# Patient Record
Sex: FEMALE | Race: WHITE | NOT HISPANIC OR LATINO | Employment: FULL TIME | URBAN - METROPOLITAN AREA
[De-identification: names, ages, dates, MRNs, and addresses within clinical notes are randomized per-mention and may not be internally consistent; named-entity substitution may affect disease eponyms.]

---

## 2021-04-06 DIAGNOSIS — Z23 ENCOUNTER FOR IMMUNIZATION: ICD-10-CM

## 2025-01-20 PROBLEM — I10 ESSENTIAL HYPERTENSION: Status: ACTIVE | Noted: 2023-04-25

## 2025-01-20 PROBLEM — K21.9 CHRONIC GERD: Status: ACTIVE | Noted: 2024-04-29

## 2025-01-20 NOTE — PROGRESS NOTES
Adult Annual Physical  Name: Haven Gallardo      : 1975      MRN: [id number]  Encounter Provider: Erlin Ho DO  Encounter Date: 2025   Encounter department: [facility] IN PARTNERSHIP WITH [facility]    Assessment & Plan  Annual physical exam    Orders:    CBC and differential; Future    Comprehensive metabolic panel; Future    Lipid Panel with Direct LDL reflex; Future    POCT hemoglobin A1c    Screening mammogram for breast cancer  Up-to-date.  Follows with GYN.       Cervical cancer screening  Up-to-date.  Follows with GYN.       Encounter for vaccination  Up-to-date on tetanus vaccine.  No vaccines given today.       Colon cancer screening  Patient had Cologuard performed in November.  Up-to-date       Essential hypertension  Patient is currently on amlodipine 5 mg daily and lisinopril 20 mg daily.  She has felt off since starting her blood pressure medication over a year ago.  She has had problems with feelings of fatigue and not having the energy that she used to.  She also notes that she is starting to gain some weight over time.  I will check a cortisol level along with ACTH and TSH  Orders:    ACTH; Future    Cortisol Level, AM Specimen; Future    Vitamin D 25 hydroxy; Future    Vitamin B12/Folate, Serum Panel; Future    TSH, 3rd generation with Free T4 reflex; Future    Chronic GERD  Patient reports that she has had a chronic cough for 4 years.  She was diagnosed with GERD and had an upper endoscopy recently by GI specialty.  She notes that in the past when she is taking medication for her GERD consistently her cough symptoms have been better.  Her blood pressure medications were introduced in the past year and the cough has been going on for 4 years.  I suspect that this is likely from the GERD and we will start her on Protonix 40 mg daily.  I will to recheck how she is doing in 1 month to see if it helped with her cough.  Orders:    pantoprazole  (PROTONIX) 40 mg tablet; Take 1 tablet (40 mg total) by mouth daily    JUDY (generalized anxiety disorder)  Patient has a history of general anxiety which she manages with Xanax 0.5 mg as needed.  I will take over management of her anxiety.  Patient signed a drug contract today and will do a urine drug screen at next visit.  She typically uses the medication as needed but typically requires a refill almost every month.       Long term use of drug    Orders:    ACTH; Future    Cortisol Level, AM Specimen; Future    Vitamin D 25 hydroxy; Future    Vitamin B12/Folate, Serum Panel; Future    Fatigue, unspecified type  Patient is currently on amlodipine 5 mg daily and lisinopril 20 mg daily.  She has felt off since starting her blood pressure medication over a year ago.  She has had problems with feelings of fatigue and not having the energy that she used to.  She also notes that she is starting to gain some weight over time.  I will check a cortisol level along with ACTH and TSH  Orders:    ACTH; Future    Cortisol Level, AM Specimen; Future    Vitamin D 25 hydroxy; Future    Vitamin B12/Folate, Serum Panel; Future    TSH, 3rd generation with Free T4 reflex; Future    Obesity (BMI 30-39.9)  Patient is concerned about having weight gain despite  being physically active.  I will do some lab work to evaluate for underlying conditions causing weight retention.  I discussed our care management program with the patient and we will consider this as an option if she wants to pursue weight loss medications in the future.  As of right now she would like to do her labs first and then reconvene to talk about this later on    Orders:    TSH, 3rd generation with Free T4 reflex; Future    Immunizations and preventive care screenings were discussed with patient today. Appropriate education was printed on patient's after visit summary.    Counseling:  Dental Health: discussed importance of regular tooth brushing, flossing, and dental  visits.  Exercise: the importance of regular exercise/physical activity was discussed. Recommend exercise 3-5 times per week for at least 30 minutes.     BMI Counseling: Body mass index is 32.12 kg/m². The BMI is above normal. Nutrition recommendations include encouraging healthy choices of fruits and vegetables. Exercise recommendations include exercising 3-5 times per week. Rationale for BMI follow-up plan is due to patient being overweight or obese.     Depression Screening and Follow-up Plan: Patient was screened for depression during today's encounter. They screened negative with a PHQ-2 score of 0.        Patient was seen today for an annual physical exam. Age appropriate screening tests were done as above. Annual labs were ordered to be done through [facility]. Patient will be contacted regarding results and follow up will be based on findings. Patient was counseled on the importance of proper diet and exercise. I recommend diets rich in lean meats and leafy green vegetables. Try to have 150 minutes of exercise weekly at moderate intensity. See problem based charting for any chronic problems or new findings and current workup/management.    I discussed patient's echocardiogram with her and tried to explain with the findings show.  I did advise her that there was nothing significantly wrong on the testing other than some mild mitral regurgitation.    40 minutes were spent on chart review, examination, and plan of care. This note was dictated using software.     History of Present Illness     Adult Annual Physical:  Patient presents for annual physical.     Diet and Physical Activity:    - Exercise: walking and moderate cardiovascular exercise.    Depression Screening:  - PHQ-2 Score: 0    General Health:  - Sleep: sleeps well.    /GYN Health:  - Follows with GYN: yes.     Review of Systems   Constitutional:  Positive for fatigue. Negative for fever.   Respiratory:  Positive for cough. Negative for shortness  "of breath.    Cardiovascular:  Negative for chest pain.   Gastrointestinal:  Negative for abdominal pain, constipation and diarrhea.        Gerd   Genitourinary:  Negative for difficulty urinating.   Skin:  Negative for rash.     Patient has been having a cough for 4 years. Patient states that she had covid in November of 2021. [facility] and had [facility] again. She has continued to have the cough. She initially has been diagnosed with acid reflux and has been treated for it.     Patient has also had issues with fatigue and general loss of energy since going on her blood pressure medication over a year ago.  She is unsure if it is related to the medication or if something else could be going on.  She has noticed that despite exercising regularly she is not losing any weight.  This is a concern for her and she has even thought about weight medications but wanted to ask some questions about them.    She also had a echocardiogram done on her heart and had some questions today about what the findings are on the test.  She has not been contacted by anyone about her results yet.    Objective   /70 (BP Location: Right arm, Patient Position: Sitting, Cuff Size: Large)   Pulse 65   Temp 98.4 °F (36.9 °C) (Temporal)   Resp 16   Ht 5' 5\" (1.651 m)   Wt 87.5 kg (193 lb)   SpO2 97%   BMI 32.12 kg/m²     Physical Exam  Vitals and nursing note reviewed.   Constitutional:       General: She is not in acute distress.     Appearance: Normal appearance. She is well-developed. She is obese.   HENT:      Head: Normocephalic and atraumatic.      Right Ear: Tympanic membrane, ear canal and external ear normal.      Left Ear: Tympanic membrane, ear canal and external ear normal.      Nose: Nose normal. No congestion.      Mouth/Throat:      Mouth: Mucous membranes are moist.      Pharynx: No oropharyngeal exudate or posterior oropharyngeal erythema.   Eyes:      Extraocular Movements: Extraocular movements intact. "      Conjunctiva/sclera: Conjunctivae normal.      Pupils: Pupils are equal, round, and reactive to light.   Cardiovascular:      Rate and Rhythm: Normal rate and regular rhythm.      Heart sounds: Normal heart sounds. No murmur heard.  Pulmonary:      Effort: Pulmonary effort is normal. No respiratory distress.      Breath sounds: Normal breath sounds. No wheezing.   Abdominal:      General: Abdomen is flat.      Palpations: Abdomen is soft.      Tenderness: There is no abdominal tenderness. There is no guarding.   Musculoskeletal:         General: Normal range of motion.      Cervical back: Neck supple.   Lymphadenopathy:      Cervical: No cervical adenopathy.   Skin:     General: Skin is warm and dry.      Findings: No rash.   Neurological:      General: No focal deficit present.      Mental Status: She is alert and oriented to person, place, and time. Mental status is at baseline.   Psychiatric:         Mood and Affect: Mood normal.         Behavior: Behavior normal.         Thought Content: Thought content normal.         Judgment: Judgment normal.

## 2025-01-23 ENCOUNTER — TELEPHONE (OUTPATIENT)
Dept: ADMINISTRATIVE | Facility: OTHER | Age: 50
End: 2025-01-23

## 2025-01-23 NOTE — TELEPHONE ENCOUNTER
----- Message from Sayra GANDHI sent at 1/23/2025  9:47 AM EST -----  Regarding: Quality Update  01/23/25 9:47 AM    Mary, our patient Haven Gallardo has had CRC: Cologuard completed/performed. Please assist in updating the patient chart by pulling the Care Everywhere (CE) document. The date of service is 11/16/2024.     Thank you,  GEOVANNA Stroud [facility]

## 2025-01-23 NOTE — TELEPHONE ENCOUNTER
----- Message from Sayra GANDHI sent at 1/23/2025  9:47 AM EST -----  Regarding: Quality Update  01/23/25 9:47 AM    Mary, our patient Haven Gallardo has had Pap Smear (HPV) aka Cervical Cancer Screening completed/performed. Please assist in updating the patient chart by pulling the Care Everywhere (CE) document. The date of service is 06/20/2023.     Thank you,  GEOVANNA Stroud [facility]

## 2025-01-23 NOTE — TELEPHONE ENCOUNTER
----- Message from Sayra GANDHI sent at 1/23/2025  9:48 AM EST -----  Regarding: Quality Update  01/23/25 9:48 AM    Mary, our patient Haven Gallardo has had Mammogram completed/performed. Please assist in updating the patient chart by pulling the Care Everywhere (CE) document. The date of service is 07/27/2024.     Thank you,  GEOVANNA Stroud [facility]

## 2025-01-24 ENCOUNTER — RA CDI HCC (OUTPATIENT)
Dept: OTHER | Facility: HOSPITAL | Age: 50
End: 2025-01-24

## 2025-01-24 NOTE — TELEPHONE ENCOUNTER
Upon review of the In Basket request we were able to locate, review, and update the patient chart as requested for CRC: Cologuard, Mammogram, and Pap Smear (HPV) aka Cervical Cancer Screening.    Any additional questions or concerns should be emailed to the Practice Liaisons via the appropriate education email address, please do not reply via In Basket.    Thank you  Bella Aguilera MA   [facility]

## 2025-01-24 NOTE — PROGRESS NOTES
HCC coding opportunities       Chart reviewed, no opportunity found: CHART REVIEWED, NO OPPORTUNITY FOUND   This is a reminder to address (resolve/update/assess) ALL HCC (risk adjustment) codes as found on active problem list for 2025 as patient scores reset to zero AYANNA.     Patients Insurance        Commercial Insurance: [employer]

## 2025-01-28 ENCOUNTER — OFFICE VISIT (OUTPATIENT)
Dept: FAMILY MEDICINE CLINIC | Facility: CLINIC | Age: 50
End: 2025-01-28

## 2025-01-28 VITALS
SYSTOLIC BLOOD PRESSURE: 116 MMHG | HEART RATE: 65 BPM | RESPIRATION RATE: 16 BRPM | HEIGHT: 65 IN | DIASTOLIC BLOOD PRESSURE: 70 MMHG | WEIGHT: 193 LBS | BODY MASS INDEX: 32.15 KG/M2 | OXYGEN SATURATION: 97 % | TEMPERATURE: 98.4 F

## 2025-01-28 DIAGNOSIS — R53.83 FATIGUE, UNSPECIFIED TYPE: ICD-10-CM

## 2025-01-28 DIAGNOSIS — K21.9 CHRONIC GERD: ICD-10-CM

## 2025-01-28 DIAGNOSIS — Z12.11 COLON CANCER SCREENING: ICD-10-CM

## 2025-01-28 DIAGNOSIS — Z00.00 ANNUAL PHYSICAL EXAM: Primary | ICD-10-CM

## 2025-01-28 DIAGNOSIS — Z23 ENCOUNTER FOR VACCINATION: ICD-10-CM

## 2025-01-28 DIAGNOSIS — Z12.31 SCREENING MAMMOGRAM FOR BREAST CANCER: ICD-10-CM

## 2025-01-28 DIAGNOSIS — Z12.4 CERVICAL CANCER SCREENING: ICD-10-CM

## 2025-01-28 DIAGNOSIS — F41.1 GAD (GENERALIZED ANXIETY DISORDER): Chronic | ICD-10-CM

## 2025-01-28 DIAGNOSIS — Z79.899 LONG TERM USE OF DRUG: ICD-10-CM

## 2025-01-28 DIAGNOSIS — E66.9 OBESITY (BMI 30-39.9): ICD-10-CM

## 2025-01-28 DIAGNOSIS — I10 ESSENTIAL HYPERTENSION: ICD-10-CM

## 2025-01-28 PROBLEM — E78.2 MIXED HYPERLIPIDEMIA: Status: ACTIVE | Noted: 2025-01-28

## 2025-01-28 LAB — SL AMB POCT HEMOGLOBIN AIC: 5.2 (ref ?–6.5)

## 2025-01-28 PROCEDURE — 99213 OFFICE O/P EST LOW 20 MIN: CPT | Performed by: STUDENT IN AN ORGANIZED HEALTH CARE EDUCATION/TRAINING PROGRAM

## 2025-01-28 PROCEDURE — 83036 HEMOGLOBIN GLYCOSYLATED A1C: CPT | Performed by: STUDENT IN AN ORGANIZED HEALTH CARE EDUCATION/TRAINING PROGRAM

## 2025-01-28 PROCEDURE — 99386 PREV VISIT NEW AGE 40-64: CPT | Performed by: STUDENT IN AN ORGANIZED HEALTH CARE EDUCATION/TRAINING PROGRAM

## 2025-01-28 RX ORDER — LISINOPRIL 20 MG/1
20 TABLET ORAL EVERY MORNING
COMMUNITY

## 2025-01-28 RX ORDER — GLUCOSAMINE/D3/BOSWELLIA SERRA 1500MG-400
TABLET ORAL
COMMUNITY

## 2025-01-28 RX ORDER — ESTRADIOL/NORETHINDRONE ACETATE TRANSDERMAL SYSTEM .05; .25 MG/D; MG/D
PATCH, EXTENDED RELEASE TRANSDERMAL
COMMUNITY

## 2025-01-28 RX ORDER — PANTOPRAZOLE SODIUM 40 MG/1
40 TABLET, DELAYED RELEASE ORAL DAILY
Qty: 30 TABLET | Refills: 5 | Status: SHIPPED | OUTPATIENT
Start: 2025-01-28

## 2025-01-28 RX ORDER — AMLODIPINE BESYLATE 5 MG/1
1 TABLET ORAL DAILY
COMMUNITY
Start: 2024-11-15

## 2025-01-28 RX ORDER — ALPRAZOLAM 0.5 MG
TABLET ORAL
COMMUNITY
Start: 2025-01-20

## 2025-01-28 RX ORDER — ROSUVASTATIN CALCIUM 10 MG/1
10 TABLET, COATED ORAL DAILY
COMMUNITY

## 2025-01-28 RX ORDER — NITROFURANTOIN 25; 75 MG/1; MG/1
CAPSULE ORAL
COMMUNITY

## 2025-01-28 RX ORDER — ACETAMINOPHEN 160 MG/5ML
SUSPENSION, ORAL (FINAL DOSE FORM) ORAL
COMMUNITY

## 2025-01-28 NOTE — ASSESSMENT & PLAN NOTE
Patient reports that she has had a chronic cough for 4 years.  She was diagnosed with GERD and had an upper endoscopy recently by GI specialty.  She notes that in the past when she is taking medication for her GERD consistently her cough symptoms have been better.  Her blood pressure medications were introduced in the past year and the cough has been going on for 4 years.  I suspect that this is likely from the GERD and we will start her on Protonix 40 mg daily.  I will to recheck how she is doing in 1 month to see if it helped with her cough.  Orders:    pantoprazole (PROTONIX) 40 mg tablet; Take 1 tablet (40 mg total) by mouth daily

## 2025-01-28 NOTE — ASSESSMENT & PLAN NOTE
Patient has a history of general anxiety which she manages with Xanax 0.5 mg as needed.  I will take over management of her anxiety.  Patient signed a drug contract today and will do a urine drug screen at next visit.  She typically uses the medication as needed but typically requires a refill almost every month.

## 2025-01-28 NOTE — ASSESSMENT & PLAN NOTE
Patient is currently on amlodipine 5 mg daily and lisinopril 20 mg daily.  She has felt off since starting her blood pressure medication over a year ago.  She has had problems with feelings of fatigue and not having the energy that she used to.  She also notes that she is starting to gain some weight over time.  I will check a cortisol level along with ACTH and TSH  Orders:    ACTH; Future    Cortisol Level, AM Specimen; Future    Vitamin D 25 hydroxy; Future    Vitamin B12/Folate, Serum Panel; Future    TSH, 3rd generation with Free T4 reflex; Future

## 2025-02-17 ENCOUNTER — CLINICAL SUPPORT (OUTPATIENT)
Dept: FAMILY MEDICINE CLINIC | Facility: CLINIC | Age: 50
End: 2025-02-17

## 2025-02-17 DIAGNOSIS — I10 ESSENTIAL HYPERTENSION: ICD-10-CM

## 2025-02-17 DIAGNOSIS — E66.9 OBESITY (BMI 30-39.9): ICD-10-CM

## 2025-02-17 DIAGNOSIS — Z79.899 LONG TERM USE OF DRUG: ICD-10-CM

## 2025-02-17 DIAGNOSIS — Z00.00 ANNUAL PHYSICAL EXAM: ICD-10-CM

## 2025-02-17 DIAGNOSIS — R53.83 FATIGUE, UNSPECIFIED TYPE: ICD-10-CM

## 2025-02-17 PROCEDURE — 36415 COLL VENOUS BLD VENIPUNCTURE: CPT

## 2025-02-18 ENCOUNTER — RESULTS FOLLOW-UP (OUTPATIENT)
Dept: FAMILY MEDICINE CLINIC | Facility: CLINIC | Age: 50
End: 2025-02-18

## 2025-02-18 LAB
25(OH)D3 SERPL-MCNC: 38 NG/ML (ref 30–100)
ACTH PLAS-MCNC: NORMAL PG/ML
ALBUMIN SERPL-MCNC: 4.5 G/DL (ref 3.6–5.1)
ALBUMIN/GLOB SERPL: 1.7 (CALC) (ref 1–2.5)
ALP SERPL-CCNC: 51 U/L (ref 37–153)
ALT SERPL-CCNC: 17 U/L (ref 6–29)
AST SERPL-CCNC: 14 U/L (ref 10–35)
BASOPHILS # BLD AUTO: 28 CELLS/UL (ref 0–200)
BASOPHILS NFR BLD AUTO: 0.4 %
BILIRUB SERPL-MCNC: 0.8 MG/DL (ref 0.2–1.2)
BUN SERPL-MCNC: 12 MG/DL (ref 7–25)
BUN/CREAT SERPL: ABNORMAL (CALC) (ref 6–22)
CALCIUM SERPL-MCNC: 9.4 MG/DL (ref 8.6–10.4)
CHLORIDE SERPL-SCNC: 100 MMOL/L (ref 98–110)
CHOLEST SERPL-MCNC: 165 MG/DL
CHOLEST/HDLC SERPL: 2.1 (CALC)
CO2 SERPL-SCNC: 23 MMOL/L (ref 20–32)
CORTIS AM PEAK SERPL-MCNC: 12.6 MCG/DL
CREAT SERPL-MCNC: 0.58 MG/DL (ref 0.5–1.03)
EOSINOPHIL # BLD AUTO: 142 CELLS/UL (ref 15–500)
EOSINOPHIL NFR BLD AUTO: 2 %
ERYTHROCYTE [DISTWIDTH] IN BLOOD BY AUTOMATED COUNT: 12.7 % (ref 11–15)
FOLATE SERPL-MCNC: 15.9 NG/ML
GFR/BSA.PRED SERPLBLD CYS-BASED-ARV: 110 ML/MIN/1.73M2
GLOBULIN SER CALC-MCNC: 2.7 G/DL (CALC) (ref 1.9–3.7)
GLUCOSE SERPL-MCNC: 100 MG/DL (ref 65–99)
HCT VFR BLD AUTO: 42.5 % (ref 35–45)
HDLC SERPL-MCNC: 77 MG/DL
HGB BLD-MCNC: 14.3 G/DL (ref 11.7–15.5)
LDLC SERPL CALC-MCNC: 74 MG/DL (CALC)
LYMPHOCYTES # BLD AUTO: 1882 CELLS/UL (ref 850–3900)
LYMPHOCYTES NFR BLD AUTO: 26.5 %
MCH RBC QN AUTO: 31.2 PG (ref 27–33)
MCHC RBC AUTO-ENTMCNC: 33.6 G/DL (ref 32–36)
MCV RBC AUTO: 92.8 FL (ref 80–100)
MONOCYTES # BLD AUTO: 611 CELLS/UL (ref 200–950)
MONOCYTES NFR BLD AUTO: 8.6 %
NEUTROPHILS # BLD AUTO: 4438 CELLS/UL (ref 1500–7800)
NEUTROPHILS NFR BLD AUTO: 62.5 %
NONHDLC SERPL-MCNC: 88 MG/DL (CALC)
PLATELET # BLD AUTO: 272 THOUSAND/UL (ref 140–400)
PMV BLD REES-ECKER: 10.6 FL (ref 7.5–12.5)
POTASSIUM SERPL-SCNC: 4.3 MMOL/L (ref 3.5–5.3)
PROT SERPL-MCNC: 7.2 G/DL (ref 6.1–8.1)
RBC # BLD AUTO: 4.58 MILLION/UL (ref 3.8–5.1)
SODIUM SERPL-SCNC: 135 MMOL/L (ref 135–146)
TRIGL SERPL-MCNC: 56 MG/DL
TSH SERPL-ACNC: 2.85 MIU/L
VIT B12 SERPL-MCNC: 435 PG/ML (ref 200–1100)
WBC # BLD AUTO: 7.1 THOUSAND/UL (ref 3.8–10.8)

## 2025-02-18 NOTE — ASSESSMENT & PLAN NOTE
Patient will use the 40 mg of Protonix as needed only at this time.  She will transition back to over-the-counter medication.  Orders:    pantoprazole (PROTONIX) 40 mg tablet; Take 1 tablet (40 mg total) by mouth daily

## 2025-02-18 NOTE — PROGRESS NOTES
Name: Haven Gallardo      : 1975      MRN: [id number]  Encounter Provider: Erlin Ho DO  Encounter Date: 2025   Encounter department: [facility] IN PARTNERSHIP WITH [facility]  :  Assessment & Plan  Essential hypertension  Patient would like to try discontinuing her current blood pressure medications and starting a new medication.  She is concerned that her medications are causing her fatigue.  I will discontinue amlodipine and lisinopril.  I will transition her to valsartan 320 mg daily.  Will reassess in 1 month.  Orders:    valsartan (DIOVAN) 320 MG tablet; Take 1 tablet (320 mg total) by mouth daily    Chronic GERD  Patient will use the 40 mg of Protonix as needed only at this time.  She will transition back to over-the-counter medication.  Orders:    pantoprazole (PROTONIX) 40 mg tablet; Take 1 tablet (40 mg total) by mouth daily    Fatigue, unspecified type  Will attempt to switch blood pressure medication and see if this helps with her chronic fatigue symptoms.       Chronic cough  Suspect that cough is likely due to chronic nasal drainage down the back of the throat.  Will start ipratropium nasal spray 2 sprays 3 times a day as needed  Orders:    ipratropium (ATROVENT) 0.06 % nasal spray; 2 sprays into each nostril 3 (three) times a day as needed for rhinitis    pantoprazole (PROTONIX) 40 mg tablet; Take 1 tablet (40 mg total) by mouth daily    Obesity (BMI 30-39.9)  Patient would like to begin the process for starting weight loss medications.  I will refer to our care management    Orders:    Ambulatory referral to complex care management program; Future             Patient is a 49-year-old female presenting today for follow-up on blood pressure, fatigue and previous cough.  See problem based charting as above for full plan of care.  Will reassess in 1 month for repeat blood pressure check.      History of Present Illness   HPI  Review of Systems  "  Constitutional:  Positive for fatigue. Negative for fever.   HENT:  Positive for postnasal drip.    Respiratory:  Positive for cough. Negative for shortness of breath.    Cardiovascular:  Negative for chest pain.   Gastrointestinal:  Negative for abdominal pain.        Gerd   Skin:  Negative for rash.     Patient is presenting today to review recent lab work for fatigue.  We are also following up on previous cough which I thought was likely due to GERD.  I placed her on Protonix 40 mg daily.    She stated that the Protonix helped reduce her cough frequency but was not any different to using the over-the-counter medication consistently.  She would like to stop this and restart the over-the-counter medication.    She has been having a lot of postnasal drip which may be contributing to her current symptoms.  The cough is mainly the worst in the mornings and evenings.    Her lab work showed no medical causes of fatigue.  She feels that her medications are making her fatigued and would like to consider trying a different blood pressure med.    She would also like to discuss weight management and going on a medication for weight.    Objective   /62 (BP Location: Right arm, Patient Position: Sitting, Cuff Size: Large)   Pulse 82   Temp 97.8 °F (36.6 °C) (Temporal)   Resp 14   Ht 5' 5\" (1.651 m)   Wt 87.5 kg (193 lb)   SpO2 98%   BMI 32.12 kg/m²      Physical Exam  Vitals and nursing note reviewed.   Constitutional:       General: She is not in acute distress.     Appearance: Normal appearance. She is well-developed. She is obese.   HENT:      Head: Normocephalic and atraumatic.      Right Ear: External ear normal.      Left Ear: External ear normal.      Nose: Nose normal.   Eyes:      Conjunctiva/sclera: Conjunctivae normal.   Pulmonary:      Effort: Pulmonary effort is normal. No respiratory distress.   Skin:     General: Skin is warm and dry.      Findings: No rash.   Neurological:      General: No focal " deficit present.      Mental Status: She is alert and oriented to person, place, and time. Mental status is at baseline.   Psychiatric:         Mood and Affect: Mood normal.         Behavior: Behavior normal.         Thought Content: Thought content normal.         Judgment: Judgment normal.       Administrative Statements   I have spent a total time of 20 minutes in caring for this patient on the day of the visit/encounter including Instructions for management, Documenting in the medical record, and Obtaining or reviewing history  .

## 2025-02-18 NOTE — ASSESSMENT & PLAN NOTE
Patient would like to try discontinuing her current blood pressure medications and starting a new medication.  She is concerned that her medications are causing her fatigue.  I will discontinue amlodipine and lisinopril.  I will transition her to valsartan 320 mg daily.  Will reassess in 1 month.  Orders:    valsartan (DIOVAN) 320 MG tablet; Take 1 tablet (320 mg total) by mouth daily

## 2025-02-28 ENCOUNTER — OFFICE VISIT (OUTPATIENT)
Dept: FAMILY MEDICINE CLINIC | Facility: CLINIC | Age: 50
End: 2025-02-28

## 2025-02-28 VITALS
HEIGHT: 65 IN | TEMPERATURE: 97.8 F | RESPIRATION RATE: 14 BRPM | SYSTOLIC BLOOD PRESSURE: 102 MMHG | BODY MASS INDEX: 32.15 KG/M2 | DIASTOLIC BLOOD PRESSURE: 62 MMHG | HEART RATE: 82 BPM | WEIGHT: 193 LBS | OXYGEN SATURATION: 98 %

## 2025-02-28 DIAGNOSIS — K21.9 CHRONIC GERD: ICD-10-CM

## 2025-02-28 DIAGNOSIS — E66.9 OBESITY (BMI 30-39.9): ICD-10-CM

## 2025-02-28 DIAGNOSIS — R53.83 FATIGUE, UNSPECIFIED TYPE: ICD-10-CM

## 2025-02-28 DIAGNOSIS — I10 ESSENTIAL HYPERTENSION: Primary | ICD-10-CM

## 2025-02-28 DIAGNOSIS — R05.3 CHRONIC COUGH: ICD-10-CM

## 2025-02-28 PROCEDURE — 99213 OFFICE O/P EST LOW 20 MIN: CPT | Performed by: STUDENT IN AN ORGANIZED HEALTH CARE EDUCATION/TRAINING PROGRAM

## 2025-02-28 RX ORDER — VALSARTAN 320 MG/1
320 TABLET ORAL DAILY
Qty: 30 TABLET | Refills: 5 | Status: SHIPPED | OUTPATIENT
Start: 2025-02-28

## 2025-02-28 RX ORDER — PANTOPRAZOLE SODIUM 40 MG/1
40 TABLET, DELAYED RELEASE ORAL DAILY
Qty: 30 TABLET | Refills: 5
Start: 2025-02-28

## 2025-02-28 RX ORDER — IPRATROPIUM BROMIDE 42 UG/1
2 SPRAY, METERED NASAL 3 TIMES DAILY PRN
Qty: 15 ML | Refills: 2 | Status: SHIPPED | OUTPATIENT
Start: 2025-02-28

## 2025-02-28 NOTE — ASSESSMENT & PLAN NOTE
Suspect that cough is likely due to chronic nasal drainage down the back of the throat.  Will start ipratropium nasal spray 2 sprays 3 times a day as needed  Orders:    ipratropium (ATROVENT) 0.06 % nasal spray; 2 sprays into each nostril 3 (three) times a day as needed for rhinitis    pantoprazole (PROTONIX) 40 mg tablet; Take 1 tablet (40 mg total) by mouth daily

## 2025-03-03 ENCOUNTER — PATIENT OUTREACH (OUTPATIENT)
Age: 50
End: 2025-03-03

## 2025-03-03 NOTE — PROGRESS NOTES
Called Patient to introduce them to care management program. No answer, voicemail left with callback information. My Chart message sent as well with request to respond with best day and time for outreach. Will reach out again if no return response.    Patient responded via [employer] to schedule initial outreach phone call.

## 2025-03-19 ENCOUNTER — PATIENT OUTREACH (OUTPATIENT)
Dept: FAMILY MEDICINE CLINIC | Facility: CLINIC | Age: 50
End: 2025-03-19

## 2025-03-19 NOTE — PROGRESS NOTES
Patient responded to initial outreach with times open, sent response with days this week and patient did not respond. Sent additional message with availability next week. If you response in 2 weeks will send unable to reach and close case.

## 2025-03-23 DIAGNOSIS — F41.1 GAD (GENERALIZED ANXIETY DISORDER): Primary | Chronic | ICD-10-CM

## 2025-03-24 RX ORDER — ALPRAZOLAM 0.5 MG
0.5 TABLET ORAL DAILY PRN
Qty: 30 TABLET | Refills: 0 | Status: SHIPPED | OUTPATIENT
Start: 2025-03-24

## 2025-03-26 ENCOUNTER — PATIENT OUTREACH (OUTPATIENT)
Dept: FAMILY MEDICINE CLINIC | Facility: CLINIC | Age: 50
End: 2025-03-26

## 2025-03-26 NOTE — PROGRESS NOTES
Spoke with patient during scheduled outreach. Patient has done ww in the past and noom in the last 4years though nothing has worked. I provided overview of program and she consented to participation. She drinks about 64oz of water but 10oz of coffee and about 2-6oz of vodka with cranberry at night. Explained hydration protocol. She also said she would like to start in on food tracking. Provided name of anuj and asked that she track from now until next call. She also said she eats an apple for breakfast and shakeology for lunch and a large dinner. We discussed options that she doesn't have an aversion to in order to boost the protein. Patient verbalized understanding. Next phone outreach 4/2

## 2025-04-02 ENCOUNTER — PATIENT OUTREACH (OUTPATIENT)
Dept: FAMILY MEDICINE CLINIC | Facility: CLINIC | Age: 50
End: 2025-04-02

## 2025-04-02 NOTE — PROGRESS NOTES
Patient was able to meet water goal of 80oz. She did track as well and was between 5051-5691. Chose a goal to start with of 9882-5536 calories. Macros were F53, C25, P22. Starting with fat goal of 20% or 29g of fat a day. Helped patient to adjust anuj to new goals. She did not have any questions or concerns. Patient verbalized understanding of goals. Next phone outreach 4/30

## 2025-04-06 NOTE — PROGRESS NOTES
Name: Haven Gallardo      : 1975      MRN: [id number]  Encounter Provider: Erlin Ho DO  Encounter Date: 2025   Encounter department: [facility] IN PARTNERSHIP WITH [facility]  :  Assessment & Plan  Essential hypertension                  Patient is a 49-year-old female who had her previous blood pressure medication stopped due to fatigue.  She was switched to valsartan and is here to reassess blood pressure today.  Blood pressure is under control but she is continuing to feel fatigue at this time.  Her labs have been normal.  At this point my recommendation was that we could try switching to a whole new drug class entirely and use a diuretic.  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  She would like to trial this in the summer when she is able to.  She will reach out to me in  and we will start her on chlorthalidone with a follow-up 1 month after    History of Present Illness   HPI  Review of Systems   Constitutional:  Positive for fatigue. Negative for fever.   Respiratory:  Negative for shortness of breath.    Cardiovascular:  Negative for chest pain.   Gastrointestinal:  Negative for abdominal pain.   Skin:  Negative for rash.     Patient is a 49-year-old female who is presenting today for blood pressure check.  She had her previous medications discontinued and was switched to valsartan.  She has continued to notice fatigue.  She is currently on hormonal treatment for menopausal symptoms so she is also not sure if this just coincided with when she started the medication.    Objective   There were no vitals taken for this visit.     Physical Exam  Vitals and nursing note reviewed.   Constitutional:       General: She is not in acute distress.     Appearance: Normal appearance. She is well-developed.   HENT:      Head: Normocephalic and atraumatic.      Right Ear: External ear normal.      Left Ear:  External ear normal.      Nose: Nose normal.   Eyes:      Conjunctiva/sclera: Conjunctivae normal.   Cardiovascular:      Rate and Rhythm: Normal rate and regular rhythm.   Pulmonary:      Effort: Pulmonary effort is normal. No respiratory distress.   Skin:     General: Skin is dry.      Findings: No rash.   Neurological:      General: No focal deficit present.      Mental Status: She is alert and oriented to person, place, and time. Mental status is at baseline.   Psychiatric:         Mood and Affect: Mood normal.         Behavior: Behavior normal.         Thought Content: Thought content normal.         Judgment: Judgment normal.       Administrative Statements   I have spent a total time of 20 minutes in caring for this patient on the day of the visit/encounter including Instructions for management, Documenting in the medical record, and Obtaining or reviewing history  .

## 2025-04-16 ENCOUNTER — OFFICE VISIT (OUTPATIENT)
Dept: FAMILY MEDICINE CLINIC | Facility: CLINIC | Age: 50
End: 2025-04-16

## 2025-04-16 VITALS
DIASTOLIC BLOOD PRESSURE: 84 MMHG | HEIGHT: 65 IN | HEART RATE: 80 BPM | WEIGHT: 191 LBS | SYSTOLIC BLOOD PRESSURE: 120 MMHG | OXYGEN SATURATION: 99 % | BODY MASS INDEX: 31.82 KG/M2

## 2025-04-16 DIAGNOSIS — I10 ESSENTIAL HYPERTENSION: Primary | ICD-10-CM

## 2025-04-16 PROCEDURE — 99213 OFFICE O/P EST LOW 20 MIN: CPT | Performed by: STUDENT IN AN ORGANIZED HEALTH CARE EDUCATION/TRAINING PROGRAM

## 2025-04-20 DIAGNOSIS — F41.1 GAD (GENERALIZED ANXIETY DISORDER): Chronic | ICD-10-CM

## 2025-04-20 RX ORDER — ALPRAZOLAM 0.5 MG
0.5 TABLET ORAL DAILY PRN
Qty: 30 TABLET | Refills: 0 | Status: SHIPPED | OUTPATIENT
Start: 2025-04-20

## 2025-04-30 ENCOUNTER — PATIENT OUTREACH (OUTPATIENT)
Dept: FAMILY MEDICINE CLINIC | Facility: CLINIC | Age: 50
End: 2025-04-30

## 2025-04-30 NOTE — PROGRESS NOTES
Patient went away a couple times in the month and it through her off. Talked about ways to get the fats down and protein up. Patient verbalized understanding. Next phone outreach 5/28

## 2025-05-21 DIAGNOSIS — F41.1 GAD (GENERALIZED ANXIETY DISORDER): Chronic | ICD-10-CM

## 2025-05-21 RX ORDER — ALPRAZOLAM 0.5 MG
0.5 TABLET ORAL DAILY PRN
Qty: 30 TABLET | Refills: 0 | Status: SHIPPED | OUTPATIENT
Start: 2025-05-21

## 2025-05-28 ENCOUNTER — TELEPHONE (OUTPATIENT)
Dept: FAMILY MEDICINE CLINIC | Facility: CLINIC | Age: 50
End: 2025-05-28

## 2025-05-28 ENCOUNTER — PATIENT OUTREACH (OUTPATIENT)
Dept: FAMILY MEDICINE CLINIC | Facility: CLINIC | Age: 50
End: 2025-05-28

## 2025-05-28 DIAGNOSIS — E66.9 OBESITY (BMI 30-39.9): Primary | ICD-10-CM

## 2025-05-28 NOTE — PROGRESS NOTES
Patient has been doing everything right with calories and macros and has not lost weight. She understands the cost of medication is increasing, but would like to get started. Sent message to provider to update. Sent message to team to make them aware. Patient verbalized understanding.

## 2025-05-28 NOTE — TELEPHONE ENCOUNTER
Haven Gallardo ([place]: [id number])  Zepbound 2.5MG/0.5ML pen-injectors  Form  [employer] Electronic PA Form (2017 NCPDP)    Initiated and completed PA on Zepbound 2.5Mg as requested per Care Manager, Angeles NAVA RN. PA for Zepbound 2.5mg was approved.     Pharmacist, Karine GANDHI will notify patient  of approval at the time of visit on 05/29/25.    CASSANDRA Maza

## 2025-05-29 ENCOUNTER — TELEMEDICINE (OUTPATIENT)
Age: 50
End: 2025-05-29

## 2025-05-29 DIAGNOSIS — E66.9 OBESITY (BMI 30-39.9): Primary | ICD-10-CM

## 2025-05-29 PROCEDURE — PBNCHG PB NO CHARGE PLACEHOLDER: Performed by: PHARMACIST

## 2025-05-29 RX ORDER — TIRZEPATIDE 2.5 MG/.5ML
2.5 INJECTION, SOLUTION SUBCUTANEOUS WEEKLY
Qty: 2 ML | Refills: 0 | Status: CANCELLED | OUTPATIENT
Start: 2025-05-29 | End: 2025-06-26

## 2025-05-29 RX ORDER — TIRZEPATIDE 2.5 MG/.5ML
2.5 INJECTION, SOLUTION SUBCUTANEOUS WEEKLY
Qty: 2 ML | Refills: 0 | Status: SHIPPED | OUTPATIENT
Start: 2025-05-29 | End: 2025-06-26

## 2025-05-29 RX ORDER — FAMOTIDINE 20 MG/1
20 TABLET, FILM COATED ORAL 2 TIMES DAILY
COMMUNITY

## 2025-05-29 NOTE — PROGRESS NOTES
[facility]  Karine Cummins, Pharmacist     Haven Gallardo is a 50 y.o. female who was referred to the pharmacist for weight management medication education referred by Erlin Ho DO .    Assessment/ Plan      Assessment & Plan  Obesity (BMI 30-39.9)  Prior Authorization Clinical Questions for Weight Management Pharmacotherapy    2. Does the patient have a diagnosis of obesity, confirmed by a BMI greater than or equal to 30 kg/m^2?: Yes  3. Does the patient have a BMI of greater than or equal to 27 kg/m^2 with at least one weight-related comorbidity/risk factor/complication (e.g. diabetes, dyslipidemia, coronary artery disease)?: Yes  4. Weight-related co-morbidities/risk factors: dyslipidemia, hypertension, GERD  5. WEGOVY CVA Indication: Does patient have established documented cardiovascular disease (history of a prior heart attack (myocardial infarction), stroke, or symptomatic peripheral arterial disease (PAD)?: N/A  6. ZEPBOUND WILBER Indication: Does patient have documented WILBER diagnosed via sleep study (insurance will require copy of sleep study results for approval)?: N/A  7. Has the patient been on a weight loss regimen of low-calorie diet, increased physical activity, and lifestyle modifications for a minimum of 6 months?: Yes  8. Has the patient completed a comprehensive weight loss program (ie, Weight Watchers, Noom, Bariatrics, other anuj on phone)? If so, what?: Yes  9. Does the patient have a history of type 2 diabetes?: No  10. Has the member tried and failed other weight loss medication within the past 12 months?: No  11. Will the member use requested medication in combination with another GLP agonist or weight loss drug?: No  12. Is the medication a controlled substance?: No     Baseline weight (in pounds): 191 lbs  Current weight (in pounds): 191 lbs  Weight loss percentage: 0%         Baseline BMI:  Estimated body mass index is 31.78 kg/m² as calculated from  "the following:    Height as of 4/16/25: 5' 5\" (1.651 m).    Weight as of 4/16/25: 86.6 kg (191 lb).  Initiation or Continuation of Therapy: Initiation  Contraindications to pharmacotherapy for weight management:  Pancreatitis: no  MEN2/ MTC: no  Seizure: no  Cardiovascular disease: no  Uncontrolled HTN: no  Opioid use: no  Hyperthyroidism: no  Glaucoma: no  Cholestasis: no   Pregnant or trying to become pregnant: No  Drug interactions: None identified; not on Oral contraceptive pills (OCP)     Assessment: BMI > 30. Pharmacotherapy for weight management is indicated.     Educated patient on mechanism of action, potential side effects (N/V/D, constipation, headaches, increased HR), warning symptoms (severe upper abdominal pain or radiating pain towards back, severe N/V), medication storage, administration, and dosing schedule      Changes to medication regimen:  Initiate Zepbound 2.5 mg weekly       Orders:    tirzepatide (Zepbound) 2.5 mg/0.5 mL auto-injector; Inject 0.5 mL (2.5 mg total) under the skin once a week for 28 days       Monitoring: weight on home scale weekly, renal function if severe GI side effects, BP and HR with follow up office visits, routine lipid panel      Subjective        Previous medications for weight management  None    2. Lifestyle:   In the past 6 months the patient has done the following diet interventions: Followed with care management for diet and lifestyle intervention including but not limited to smaller portion sizes, calorie restriction, increased water intake, and increase in physical activity.        Objective       ASCVD Risk:  The 10-year ASCVD risk score (Thuy CLANCY, et al., 2019) is: 0.8%    Values used to calculate the score:      Age: 50 years      Clincally relevant sex: Female      Is Non- : No      Diabetic: No      Tobacco smoker: No      Systolic Blood Pressure: 120 mmHg      Is BP treated: Yes      HDL Cholesterol: 77 mg/dL      Total " Cholesterol: 165 mg/dL     Vitals:    Wt Readings from Last 5 Encounters:   04/16/25 86.6 kg (191 lb)   02/28/25 87.5 kg (193 lb)   01/28/25 87.5 kg (193 lb)       BP Readings from Last 3 Encounters:   04/16/25 120/84   02/28/25 102/62   01/28/25 116/70       Pulse Readings from Last 3 Encounters:   04/16/25 80   02/28/25 82   01/28/25 65       Labs:  Lab Results   Component Value Date    SODIUM 135 02/17/2025    K 4.3 02/17/2025     02/17/2025    CO2 23 02/17/2025    AGAP 7 07/10/2024    BUN 12 02/17/2025    CREATININE 0.58 02/17/2025    GLUC 100 (H) 02/17/2025    CALCIUM 9.4 02/17/2025    AST 14 02/17/2025    ALT 17 02/17/2025    ALKPHOS 51 02/17/2025    TP 7.2 02/17/2025    TBILI 0.8 02/17/2025    EGFR 110 02/17/2025         Pharmacist Tracking Tool       Pharmacist Tracking Tool  Reason For Outreach: Embedded Pharmacist  Demographics:  Intervention Method: Video  Type of Intervention: New  Topics Addressed: Obesity  Pharmacologic Interventions: Medication Initiation and Med Rec  Non-Pharmacologic Interventions: Disease state education and Medication/Device education  Time:  Direct Patient Care: 25 mins  Care Coordination: 10 mins  Recommendation Recipient: Patient/Caregiver and Provider  Outcome: Accepted

## 2025-05-31 DIAGNOSIS — E78.2 MIXED HYPERLIPIDEMIA: Primary | ICD-10-CM

## 2025-06-02 RX ORDER — ROSUVASTATIN CALCIUM 10 MG/1
10 TABLET, COATED ORAL DAILY
Qty: 30 TABLET | Refills: 11 | Status: SHIPPED | OUTPATIENT
Start: 2025-06-02

## 2025-06-22 DIAGNOSIS — F41.1 GAD (GENERALIZED ANXIETY DISORDER): Chronic | ICD-10-CM

## 2025-06-22 RX ORDER — ALPRAZOLAM 0.5 MG
0.5 TABLET ORAL DAILY PRN
Qty: 30 TABLET | Refills: 0 | Status: SHIPPED | OUTPATIENT
Start: 2025-06-22

## 2025-06-23 ENCOUNTER — PATIENT OUTREACH (OUTPATIENT)
Age: 50
End: 2025-06-23

## 2025-06-23 NOTE — PROGRESS NOTES
Chart review. Sent message to patient to inquire about tolerability of GLP. Will await a response.

## 2025-06-24 ENCOUNTER — ANNUAL EXAM (OUTPATIENT)
Age: 50
End: 2025-06-24
Payer: COMMERCIAL

## 2025-06-24 VITALS
HEIGHT: 65 IN | DIASTOLIC BLOOD PRESSURE: 62 MMHG | BODY MASS INDEX: 29.76 KG/M2 | SYSTOLIC BLOOD PRESSURE: 114 MMHG | WEIGHT: 178.6 LBS

## 2025-06-24 DIAGNOSIS — Z12.31 VISIT FOR SCREENING MAMMOGRAM: ICD-10-CM

## 2025-06-24 DIAGNOSIS — Z01.419 WELL FEMALE EXAM WITH ROUTINE GYNECOLOGICAL EXAM: Primary | ICD-10-CM

## 2025-06-24 DIAGNOSIS — N95.0 POSTMENOPAUSAL BLEEDING: ICD-10-CM

## 2025-06-24 DIAGNOSIS — N39.0 FREQUENT UTI: ICD-10-CM

## 2025-06-24 DIAGNOSIS — Z12.4 SCREENING FOR CERVICAL CANCER: ICD-10-CM

## 2025-06-24 DIAGNOSIS — N95.1 MENOPAUSAL SYMPTOMS: ICD-10-CM

## 2025-06-24 PROCEDURE — G0476 HPV COMBO ASSAY CA SCREEN: HCPCS | Performed by: PHYSICIAN ASSISTANT

## 2025-06-24 PROCEDURE — G0145 SCR C/V CYTO,THINLAYER,RESCR: HCPCS | Performed by: PHYSICIAN ASSISTANT

## 2025-06-24 PROCEDURE — 99213 OFFICE O/P EST LOW 20 MIN: CPT | Performed by: PHYSICIAN ASSISTANT

## 2025-06-24 PROCEDURE — S0610 ANNUAL GYNECOLOGICAL EXAMINA: HCPCS | Performed by: PHYSICIAN ASSISTANT

## 2025-06-24 RX ORDER — ESTRADIOL/NORETHINDRONE ACETATE TRANSDERMAL SYSTEM .05; .25 MG/D; MG/D
1 PATCH, EXTENDED RELEASE TRANSDERMAL 2 TIMES WEEKLY
Qty: 8 PATCH | Refills: 2 | Status: SHIPPED | OUTPATIENT
Start: 2025-06-26

## 2025-06-24 RX ORDER — NITROFURANTOIN 25; 75 MG/1; MG/1
CAPSULE ORAL
Qty: 30 CAPSULE | Refills: 2 | Status: SHIPPED | OUTPATIENT
Start: 2025-06-24

## 2025-06-24 NOTE — PROGRESS NOTES
Assessment & Plan  Well female exam with routine gynecological exam  Cervical cancer screening: pap collected  STD screening: patient declines  Breast cancer screening: mammogram ordered  Colon cancer screening: greg  completed 11/16/24       Postmenopausal bleeding    Orders:    US pelvis complete w transvaginal; Future  discussed that bleeding likely from combipatch due to timing but concern with any PMB for uterine cancer.  Patient will continue combipatch for now and get US.  Discussed possible endometrial biopsy and switch to something different for menopausal symptoms.   Menopausal symptoms    Orders:    CombiPatch 0.05-0.25 MG/DAY; Place 1 patch on the skin 2 (two) times a week    Frequent UTI    Orders:    nitrofurantoin (MACROBID) 100 mg capsule; Take one tablet daily after intercourse        CC:  Patient here for ROUTINE GYN EXAM.    HPI:    Patient is 50 y.o. year old female G 0 P 0.  She is here today for her routine gyn exam.    She has had no vaginal bleeding.  Pt does not smoke.  She denies alcohol/drug abuse.  She denies domestic violence/abuse.  She declines STD/HIV testing.  She denies problems with her breasts, bladder, or bowel.         Started combipatch  11/2024 for menopausal symptoms.  Patient not sure about timing of last menses and how long no menses prior to starting the combipatch (chart review after visit shows  Note from 10/2024 office visit says last menses was 6/2024).    Initially no bleeding X 2 months on patch.   Then started with light bleeding daily.   Wears daily liner.    Combipatch helping with hotflashes/ sleep which she is happy about.     Patient takes macrobid after intercourse due to hx of frequent UTI.  Has worked will for her.    2/17/25 TSH: 2.85    Patient with + history of abnormal pap smears:    6/2023 pap : negative, HPV HR negative  9/15/22 LEEP: LSIL  5/26/22 colpo: LSIL, inadequate  4/13/22 pap: ASCUS, HPV HR +      The patients medical, surgical, and  "family history was reviewed and updated.     Domestic violence screen: negative    Mammogram: 7/27/24    Colon cancer screening: cologuamaggy 11/16/24    Family History breast cancer:  no  Family History ovarian cancer: no  Family History colon cancer: no        Review of Systems   Constitutional: Negative.    HENT: Negative.    Eyes: Negative.    Respiratory: Negative.    Cardiovascular: Negative.    Gastrointestinal: Negative.    Endocrine: Negative.    Genitourinary:        As noted in HPI   Musculoskeletal: Negative.    Skin: Negative.    Allergic/Immunologic: Negative.    Neurological: Negative.    Hematological: Negative.    Psychiatric/Behavioral: Negative      Medications Ordered Prior to Encounter[1]    Allergies[2]    Past Surgical History[3]        Vitals:    06/24/25 0849   BP: 114/62   BP Location: Right arm   Patient Position: Sitting   Cuff Size: Standard   Weight: 81 kg (178 lb 9.6 oz)   Height: 5' 5\" (1.651 m)       Chaperone was present during exam.      EXAM:    Neck: supple without nodes or thyromegaly  Heart: regular rate and rhythm  Lungs: clear to auscultation without rales, rhonci  Breasts: nontender, no masses, no discoloration, no discharge  Abdomen: soft, nontender, no masses  Ext genitalia: no lesions, no discoloration  Urethra: no discharge or erythema  Bladder: nontender, good support  Vagina: no discharge, no lesions  Cervix: no lesions, no cervical motion tenderness  Adnexa: nontender, no masses  Uterus: nontender, normal size and shape               [1]   Current Outpatient Medications on File Prior to Visit   Medication Sig Dispense Refill    ALPRAZolam (XANAX) 0.5 mg tablet TAKE ONE TABLET BY MOUTH EVERY DAY AS NEEDED FOR ANXIETY 30 tablet 0    Biotin 10117 MCG TABS       CombiPatch 0.05-0.25 MG/DAY       famotidine (PEPCID) 20 mg tablet Take 20 mg by mouth 2 (two) times a day      nitrofurantoin (MACROBID) 100 mg capsule       rosuvastatin (CRESTOR) 10 MG tablet Take 1 tablet (10 mg " total) by mouth in the morning. 30 tablet 11    tirzepatide (Zepbound) 2.5 mg/0.5 mL auto-injector Inject 0.5 mL (2.5 mg total) under the skin once a week for 28 days 2 mL 0    valsartan (DIOVAN) 320 MG tablet Take 1 tablet (320 mg total) by mouth daily 30 tablet 5     No current facility-administered medications on file prior to visit.   [2] No Known Allergies  [3]   Past Surgical History:  Procedure Laterality Date    CERVICAL BIOPSY  W/ LOOP ELECTRODE EXCISION      WISDOM TOOTH EXTRACTION

## 2025-06-24 NOTE — PATIENT INSTRUCTIONS
Please call [phone] to schedule your imaging studies.    Patch:  vivelle dot and pill prometrium    Combined pill: Bijuva

## 2025-06-25 ENCOUNTER — PATIENT OUTREACH (OUTPATIENT)
Dept: FAMILY MEDICINE CLINIC | Facility: CLINIC | Age: 50
End: 2025-06-25

## 2025-06-25 DIAGNOSIS — E66.3 OVERWEIGHT (BMI 25.0-29.9): Primary | ICD-10-CM

## 2025-06-25 LAB
HPV HR 12 DNA CVX QL NAA+PROBE: NEGATIVE
HPV16 DNA CVX QL NAA+PROBE: NEGATIVE
HPV18 DNA CVX QL NAA+PROBE: NEGATIVE

## 2025-06-25 RX ORDER — TIRZEPATIDE 5 MG/.5ML
5 INJECTION, SOLUTION SUBCUTANEOUS WEEKLY
Qty: 2 ML | Refills: 0 | Status: SHIPPED | OUTPATIENT
Start: 2025-06-25 | End: 2025-06-25

## 2025-06-25 RX ORDER — TIRZEPATIDE 5 MG/.5ML
5 INJECTION, SOLUTION SUBCUTANEOUS WEEKLY
Qty: 6 ML | Refills: 0 | Status: SHIPPED | OUTPATIENT
Start: 2025-06-25

## 2025-06-25 NOTE — PROGRESS NOTES
Requested 90day script from provider. Updated patient via xaitment. Will outreach in 1 mth. OOO sent.     Patient sent question regarding dose progression, response sent.

## 2025-06-27 LAB
LAB AP GYN PRIMARY INTERPRETATION: NORMAL
Lab: NORMAL

## 2025-07-03 ENCOUNTER — HOSPITAL ENCOUNTER (OUTPATIENT)
Dept: ULTRASOUND IMAGING | Facility: HOSPITAL | Age: 50
End: 2025-07-03
Attending: PHYSICIAN ASSISTANT
Payer: COMMERCIAL

## 2025-07-03 DIAGNOSIS — N95.0 POSTMENOPAUSAL BLEEDING: ICD-10-CM

## 2025-07-03 PROCEDURE — 76830 TRANSVAGINAL US NON-OB: CPT

## 2025-07-03 PROCEDURE — 76856 US EXAM PELVIC COMPLETE: CPT

## 2025-07-11 ENCOUNTER — TELEPHONE (OUTPATIENT)
Dept: OBGYN CLINIC | Facility: CLINIC | Age: 50
End: 2025-07-11

## 2025-07-11 NOTE — TELEPHONE ENCOUNTER
Please let patient know the ultrasound is showing her endometrial lining is a little thicker then we would like to see in a postmenopausal female.  Since she is having the abnormal bleeding as well and endometrial biopsy is recommended.    I am out of the office until 7/21/25.  If she has any questions or concerns just let her know there may be a delay in response    Please inform and schedule pt

## 2025-07-11 NOTE — TELEPHONE ENCOUNTER
Patient called back and appointment was moved up to 7/14 with Dr. Rebolledo in the [place] location. Location was okay with patient.

## 2025-07-11 NOTE — TELEPHONE ENCOUNTER
Discussed provider result note with patient.  EMB scheduled for 9/5 and procedure explained.  She verbalized understanding, no further questions or concerns at this time.

## 2025-07-11 NOTE — TELEPHONE ENCOUNTER
Patient was called and left a voice message informing patient that Dr. Rebolledo would like her to be scheduled sooner. Left office phone number and advised patient to call the office to schedule sooner appointment.

## 2025-07-14 ENCOUNTER — PROCEDURE VISIT (OUTPATIENT)
Dept: OBGYN CLINIC | Facility: CLINIC | Age: 50
End: 2025-07-14
Payer: COMMERCIAL

## 2025-07-14 VITALS
BODY MASS INDEX: 28.89 KG/M2 | HEIGHT: 65 IN | SYSTOLIC BLOOD PRESSURE: 98 MMHG | WEIGHT: 173.4 LBS | DIASTOLIC BLOOD PRESSURE: 72 MMHG

## 2025-07-14 DIAGNOSIS — Z32.01 POSITIVE URINE PREGNANCY TEST: ICD-10-CM

## 2025-07-14 DIAGNOSIS — N93.9 ABNORMAL UTERINE BLEEDING (AUB): Primary | ICD-10-CM

## 2025-07-14 LAB — SL AMB POCT URINE HCG: POSITIVE

## 2025-07-14 PROCEDURE — 88305 TISSUE EXAM BY PATHOLOGIST: CPT | Performed by: PATHOLOGY

## 2025-07-14 PROCEDURE — 58100 BIOPSY OF UTERUS LINING: CPT | Performed by: OBSTETRICS & GYNECOLOGY

## 2025-07-14 PROCEDURE — 81025 URINE PREGNANCY TEST: CPT | Performed by: OBSTETRICS & GYNECOLOGY

## 2025-07-14 NOTE — ASSESSMENT & PLAN NOTE
UPT today faintly positive. She reports her partner had a vasectomy and she is not SA with anyone else. Discussed likelihood of pituitary HCG from perimenopausal state. Discussed that if a true intrauterine pregnancy was present then an EMB would be contraindicated. She was offered the opportunity for additional workup and deferring the EMB, but was informed that the aforementioned etiology of +HCG was more likely than a true IUP given her history. She is comfortable proceeding with EMB today.

## 2025-07-14 NOTE — ASSESSMENT & PLAN NOTE
Has been on Combipatch since November 2024  First 2 months she was on patch no bleeding, then started bleeding daily in January - mostly spotting, sometimes more of a light flow  Pelvic US on 7/3/25 with EMS 5 mm, without hypervascularity  Discussed potential additional management based on biopsy and persistence of bleeding. EMB performed today w/o difficulty  Precautions reviewed

## 2025-07-14 NOTE — PROGRESS NOTES
Endometrial biopsy    Date/Time: 7/14/2025 1:30 PM    Performed by: Idania Rebolledo MD  Authorized by: Idania Rebolledo MD    [facility] Protocol:  Consent: Verbal consent obtained. Written consent obtained  Risks and benefits: risks, benefits and alternatives were discussed  Consent given by: patient  Patient understanding: patient states understanding of the procedure being performed  Patient consent: the patient's understanding of the procedure matches consent given  Procedure consent: procedure consent matches procedure scheduled  Relevant documents: relevant documents present and verified  Test results: test results available and properly labeled  Required items: required blood products, implants, devices, and special equipment available  Patient identity confirmed: verbally with patient    Pre-procedure:     Negative urine pregnancy test: no    Procedure:     Procedure: endometrial biopsy with Pipelle      A bivalve speculum was placed in the vagina: yes      Cervix cleaned and prepped: yes      A paracervical block was performed: no      Uterus sounded: yes      Uterus sound depth (cm):  7    Specimen collected: specimen collected and sent to pathology        Abnormal uterine bleeding (AUB)  Has been on Combipatch since November 2024  First 2 months she was on patch no bleeding, then started bleeding daily in January - mostly spotting, sometimes more of a light flow  Pelvic US on 7/3/25 with EMS 5 mm, without hypervascularity  Discussed potential additional management based on biopsy and persistence of bleeding. EMB performed today w/o difficulty  Precautions reviewed    Positive urine pregnancy test  UPT today faintly positive. She reports her partner had a vasectomy and she is not SA with anyone else. Discussed likelihood of pituitary HCG from perimenopausal state. Discussed that if a true intrauterine pregnancy was present then an EMB would be contraindicated. She was offered the opportunity for  additional workup and deferring the EMB, but was informed that the aforementioned etiology of +HCG was more likely than a true IUP given her history. She is comfortable proceeding with EMB today.

## 2025-07-18 PROCEDURE — 88305 TISSUE EXAM BY PATHOLOGIST: CPT | Performed by: PATHOLOGY

## 2025-07-22 DIAGNOSIS — F41.1 GAD (GENERALIZED ANXIETY DISORDER): Chronic | ICD-10-CM

## 2025-07-22 RX ORDER — ALPRAZOLAM 0.5 MG
0.5 TABLET ORAL DAILY PRN
Qty: 30 TABLET | Refills: 0 | Status: SHIPPED | OUTPATIENT
Start: 2025-07-22

## 2025-07-24 ENCOUNTER — PATIENT OUTREACH (OUTPATIENT)
Age: 50
End: 2025-07-24

## 2025-07-24 NOTE — PROGRESS NOTES
Sent message to patient to check in regarding tolerability.   
All other review of systems negative, except as noted in HPI

## 2025-07-29 ENCOUNTER — HOSPITAL ENCOUNTER (OUTPATIENT)
Age: 50
Discharge: HOME | End: 2025-07-29
Payer: COMMERCIAL

## 2025-07-29 DIAGNOSIS — Z12.31 VISIT FOR SCREENING MAMMOGRAM: ICD-10-CM

## 2025-07-29 PROCEDURE — 77067 SCR MAMMO BI INCL CAD: CPT

## 2025-07-29 PROCEDURE — 77063 BREAST TOMOSYNTHESIS BI: CPT

## 2025-07-30 ENCOUNTER — TELEPHONE (OUTPATIENT)
Dept: OBGYN CLINIC | Facility: CLINIC | Age: 50
End: 2025-07-30

## 2025-08-01 ENCOUNTER — TELEPHONE (OUTPATIENT)
Age: 50
End: 2025-08-01

## 2025-08-01 DIAGNOSIS — N95.1 VASOMOTOR SYMPTOMS DUE TO MENOPAUSE: Primary | ICD-10-CM

## 2025-08-01 RX ORDER — ESTRADIOL AND PROGESTERONE .5; 1 MG/1; MG/1
1 CAPSULE ORAL DAILY
Qty: 30 CAPSULE | Refills: 3 | Status: SHIPPED | OUTPATIENT
Start: 2025-08-01

## 2025-08-07 ENCOUNTER — PATIENT OUTREACH (OUTPATIENT)
Age: 50
End: 2025-08-07